# Patient Record
(demographics unavailable — no encounter records)

---

## 2024-10-21 NOTE — LETTER BODY
[Dear  ___] : Dear  [unfilled], [Courtesy Letter:] : I had the pleasure of seeing your patient, [unfilled], in my office today. [Please see my note below.] : Please see my note below. [Sincerely,] : Sincerely, [FreeTextEntry3] : Osmani Garza MD  of Urology [facility]  [facility] Offices: [address] [facility], [address] [place] [address], [facility] 8 [facility], [place] [address]  TEL: [phone] FAX: [phone]

## 2024-10-21 NOTE — ASSESSMENT
[FreeTextEntry1] : Reviewed records. Discussed labs.  PSA: 0.48 (4/10/24) > 0.72 (7/10/24)  Benign Prostatic Hyperplasia: No bothersome lower urinary tract symptoms.  Prostate cancer: PSA went up.  Will do PSA today.  Patient will do PSA every 6 months here and every 6 months with Dr. Arndt.   Will inform results.  Return to office in 6 months or sooner if any issues: will do uroflow and check post void residual.

## 2024-10-21 NOTE — LETTER BODY
[Dear  ___] : Dear  [unfilled], [Courtesy Letter:] : I had the pleasure of seeing your patient, [unfilled], in my office today. [Please see my note below.] : Please see my note below. [Sincerely,] : Sincerely, [FreeTextEntry3] : Osmani Garza MD  of Urology [facility]  [facility] Offices: [address] [facility], [address] [place] [address], [facility] 8 [facility], [place] [address]  TEL: [phone] FAX: [phone]

## 2024-10-21 NOTE — HISTORY OF PRESENT ILLNESS
[FreeTextEntry1] : Primary care physician: Dr Haleh Mohseni, [facility].   10/16/2024: 81-year-old male present for follow up.  Patient reports he had colonoscopy with ablation at [facility] for his GI bleed and was told he has diverticulitis. Follows with Dr. Arndt every 6 months and had a repeat PSA Reports reasonable stream, urinates every 2 to 3 hours or so during the day and nocturia of 1-2 x.  Denies hesitancy, straining, intermittency, urgency, incontinence or sense of incomplete emptying. Denies dysuria, hematuria, lower abdominal or flank pain, fever, chills or rigors. Normal libido, erections and ejaculation.    Radiation therapy completed in August 2022 with Dr Arndt.   Status post MRI fusion prostate biopsy with Dr Kline under local anesthesia on 5/27/2022.  Pathology: Garo 4+3=7 Prostate cancer.  Initially seen for Elevated PSA.  Denied any night sweats and new bone or back pain. Weight loss 10 lbs unintentional in last 6 months.  Family history of Prostate cancer: no.  Reported reasonable stream, urinates every 2-3 hours or so during the day. Nocturia of 1-2 x.  Endorsed off and on hesitancy and straining.  No urinary incontinence.  Denied dysuria, hematuria, lower abdominal or flank pain, fever, chills or rigors. Erections 3-4/5. Normal libido.

## 2024-10-21 NOTE — HISTORY OF PRESENT ILLNESS
[FreeTextEntry1] : Primary care physician: Dr Haleh Mohseni, [facility].   10/16/2024: 81-year-old male present for follow up.  Patient reports he had colonoscopy with ablation at [facility] for his GI bleed and was told he has diverticulitis. Follows with Dr. Arndt every 6 months and had a repeat PSA Reports reasonable stream, urinates every 2 to 3 hours or so during the day and nocturia of 1-2 x.  Denies hesitancy, straining, intermittency, urgency, incontinence or sense of incomplete emptying. Denies dysuria, hematuria, lower abdominal or flank pain, fever, chills or rigors. Normal libido, erections and ejaculation.    Radiation therapy completed in August 2022 with Dr Arndt.   Status post MRI fusion prostate biopsy with Dr Kline under local anesthesia on 5/27/2022.  Pathology: Garo 4+3=7 Prostate cancer.  Initially seen for Elevated PSA.  Denied any night sweats and new bone or back pain. Weight loss 10 lbs unintentional in last 6 months.  Family history of Prostate cancer: no.  Reported reasonable stream, urinates every 2-3 hours or so during the day. Nocturia of 1-2 x.  Endorsed off and on hesitancy and straining.  No urinary incontinence.  Denied dysuria, hematuria, lower abdominal or flank pain, fever, chills or rigors. Erections 3-4/5. Normal libido.

## 2024-10-21 NOTE — END OF VISIT
[Time Spent: ___ minutes] : I have spent [unfilled] minutes of time on the encounter which excludes teaching and separately reported services. [FreeTextEntry3] : Patient was seen with Physician assistant and examined by me. Agree with above note, where necessary edits were made. Parts of this note were generated by using Dragon medical dictation software.  A reasonable effort was made to proofread and correct its contents, but typos and mistakes may still remain.If there are any questions or points of clarification needed, please contact my office.   Prior to appointment and during encounter with patient extensive medical records were reviewed including but not limited to, hospital records, outpatient records, imaging results and lab data if available. During this appointment the patient was examined, diagnoses were discussed and explained in a face-to-face manner. In addition, extensive time was spent reviewing aforementioned diagnostic studies. Counseling including test results, differential diagnoses, treatment options, risk and benefits, lifestyle changes, current condition, and current medications was performed. Patient's questions and concerns were addressed. Patient verbalized understanding of the treatment plan. Time spent is for reviewing chart, labs and images if available, counseling and care coordination.

## 2025-03-25 NOTE — HISTORY OF PRESENT ILLNESS
[FreeTextEntry1] : Patient is an 81 year old male with history of hypertension, diabetes, coronary artery disease, and known history of AAA who to the office today for follow up.  No complaints of back or abdominal pain.  No complaints at this time.

## 2025-03-25 NOTE — PHYSICAL EXAM
[JVD] : no jugular venous distention  [Normal Rate and Rhythm] : normal rate and rhythm [Abdominal Aorta] : Normal abdominal aorta [2+] : left 2+ [Ankle Swelling (On Exam)] : not present [Varicose Veins Of Lower Extremities] : not present [] : not present [No Rash or Lesion] : No rash or lesion [Alert] : alert [Calm] : calm [de-identified] : appears well

## 2025-03-25 NOTE — ASSESSMENT
[FreeTextEntry1] : 81 year old male with history of hyperlipidemia, hypertension, coronary artery disease with history of AAA.    Aortic duplex shows 5.1 cm infrarenal AAA with chronic dissection.  This is unchanged.  We will continue to monitor. No intervention necessary at this time. Risks and benefits of observation versus intervention discussed.  Patient to continue atorvastatin and management of hypertension.  Patient to follow-up in 6 months with repeat duplex. [Arterial/Venous Disease] : arterial/venous disease [Medication Management] : medication management

## 2025-04-23 NOTE — LETTER BODY
[Dear  ___] : Dear  [unfilled], [Courtesy Letter:] : I had the pleasure of seeing your patient, [unfilled], in my office today. [Please see my note below.] : Please see my note below. [Sincerely,] : Sincerely, [FreeTextEntry3] : Osmani Garza MD  of Urology [facility]  [facility] Offices: [address] [facility], [address] [place] [address], [facility] 8 [facility], [place] [address]  TEL: [phone] FAX: [phone]

## 2025-04-23 NOTE — HISTORY OF PRESENT ILLNESS
[FreeTextEntry1] : Primary care physician: Dr Haleh Mohseni, [facility].   04/23/2025:  81-year-old male presents for follow-up.  Has no bother with urination.   Same urination.   No new episodes of blood per rectum.   Recently had colonoscopy for anemia. Received iron infusions previously.  No new PSA.    Patient's creatinine is slightly elevated. Patient does follow with nephrologist, Dr. White.    10/16/2024: 81-year-old male present for follow up.  Patient reports he had colonoscopy with ablation at [facility] for his GI bleed and was told he has diverticulitis. Follows with Dr. Arndt every 6 months and had a repeat PSA Reports reasonable stream, urinates every 2 to 3 hours or so during the day and nocturia of 1-2 x.  Denies hesitancy, straining, intermittency, urgency, incontinence or sense of incomplete emptying. Denies dysuria, hematuria, lower abdominal or flank pain, fever, chills or rigors. Normal libido, erections and ejaculation.    Radiation therapy completed in August 2022 with Dr Arndt.   PSA: 0.48 (4/10/24) > 0.72 (7/10/24)  Status post MRI fusion prostate biopsy with Dr Kline under local anesthesia on 5/27/2022.  Pathology: Garo 4+3=7 Prostate cancer.  Initially seen for Elevated PSA.  Denied any night sweats and new bone or back pain. Weight loss 10 lbs unintentional in last 6 months.  Family history of Prostate cancer: no.  Reported reasonable stream, urinates every 2-3 hours or so during the day. Nocturia of 1-2 x.  Endorsed off and on hesitancy and straining.  No urinary incontinence.  Denied dysuria, hematuria, lower abdominal or flank pain, fever, chills or rigors. Erections 3-4/5. Normal libido.

## 2025-04-23 NOTE — END OF VISIT
[Time Spent: ___ minutes] : I have spent [unfilled] minutes of time on the encounter which excludes teaching and separately reported services. [FreeTextEntry3] : Patient was seen with Physician assistant and examined by me. Agree with above note, where necessary edits were made.  Parts of this note were generated by using [employer] services and Dragon medical dictation software. A reasonable effort was made to proofread and correct its contents, but typos and mistakes may still remain. If there are any questions or points of clarification needed, please contact my office.   Prior to appointment and during encounter with patient extensive medical records were reviewed including but not limited to, hospital records, outpatient records, imaging results and lab data if available. During this appointment the patient was examined, diagnoses were discussed and explained in a face-to-face manner. In addition, extensive time was spent reviewing aforementioned diagnostic studies. Counseling including test results, differential diagnoses, treatment options, risk and benefits, lifestyle changes, current condition, and current medications was performed. Patient's questions and concerns were addressed. Patient verbalized understanding of the treatment plan. Time spent is for reviewing chart, labs and images if available, counseling and care coordination.

## 2025-04-23 NOTE — ADDENDUM
[FreeTextEntry1] :  Roxi ARCHER assisted in documentation on 04/23/2025  acting as a scribe for Dr. Osmani Garza.

## 2025-04-23 NOTE — ASSESSMENT
[FreeTextEntry1] : 04/23/2025:  Will continue PSA monitoring.   Obtained PSA today.  Patient not bothered by urination. Minimal post-void residual.   Recommended that patient check PSA before follow-up appointment.   01/02/2025:  Creatinine: 1.67, was 1.52 on 02/12/024.   PSA: low and stable.    Return to office in 6 months or sooner if there are any issues.